# Patient Record
Sex: MALE
[De-identification: names, ages, dates, MRNs, and addresses within clinical notes are randomized per-mention and may not be internally consistent; named-entity substitution may affect disease eponyms.]

---
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
## 2022-05-25 NOTE — ASSESSMENT
[FreeTextEntry1] : 18 yo male , 3 weeks out of zone 3 fracture of the 5 MTS, right foot\par Long discussion was done with parents regarding diagnosis, treatment options and prognosis.\par Zone 3 fracture of the base of 5 mts, represents a vascular watershed area, making these fracture prone to nonunion, therefore protected weight bearing should be done.\par we placed him today in a non weight bearing cam boot.\par pain killer as needed\par follow up in 3 weeks for Xray and hopefully start ROM.\par Risk for nonunion was discussed with family as well.\par he will stay out of gym sport for 4 weeks, note was provided\par This plan was discussed with family. Family verbalizes understanding and agreement of plan. All questions and concerns were addressed today.\par  never